# Patient Record
Sex: MALE | Race: ASIAN | NOT HISPANIC OR LATINO | URBAN - METROPOLITAN AREA
[De-identification: names, ages, dates, MRNs, and addresses within clinical notes are randomized per-mention and may not be internally consistent; named-entity substitution may affect disease eponyms.]

---

## 2022-08-18 ENCOUNTER — APPOINTMENT (RX ONLY)
Dept: URBAN - METROPOLITAN AREA CLINIC 159 | Facility: CLINIC | Age: 52
Setting detail: DERMATOLOGY
End: 2022-08-18

## 2022-08-18 DIAGNOSIS — L81.4 OTHER MELANIN HYPERPIGMENTATION: ICD-10-CM

## 2022-08-18 DIAGNOSIS — D22 MELANOCYTIC NEVI: ICD-10-CM

## 2022-08-18 DIAGNOSIS — D18.0 HEMANGIOMA: ICD-10-CM

## 2022-08-18 DIAGNOSIS — L82.0 INFLAMED SEBORRHEIC KERATOSIS: ICD-10-CM

## 2022-08-18 PROBLEM — D22.0 MELANOCYTIC NEVI OF LIP: Status: ACTIVE | Noted: 2022-08-18

## 2022-08-18 PROBLEM — D22.4 MELANOCYTIC NEVI OF SCALP AND NECK: Status: ACTIVE | Noted: 2022-08-18

## 2022-08-18 PROBLEM — D22.21 MELANOCYTIC NEVI OF RIGHT EAR AND EXTERNAL AURICULAR CANAL: Status: ACTIVE | Noted: 2022-08-18

## 2022-08-18 PROBLEM — D48.5 NEOPLASM OF UNCERTAIN BEHAVIOR OF SKIN: Status: ACTIVE | Noted: 2022-08-18

## 2022-08-18 PROBLEM — D22.39 MELANOCYTIC NEVI OF OTHER PARTS OF FACE: Status: ACTIVE | Noted: 2022-08-18

## 2022-08-18 PROBLEM — D18.01 HEMANGIOMA OF SKIN AND SUBCUTANEOUS TISSUE: Status: ACTIVE | Noted: 2022-08-18

## 2022-08-18 PROCEDURE — ? BIOPSY BY SHAVE METHOD

## 2022-08-18 PROCEDURE — 99203 OFFICE O/P NEW LOW 30 MIN: CPT | Mod: 25

## 2022-08-18 PROCEDURE — 11102 TANGNTL BX SKIN SINGLE LES: CPT | Mod: 59

## 2022-08-18 PROCEDURE — ? COUNSELING

## 2022-08-18 PROCEDURE — 17110 DESTRUCTION B9 LES UP TO 14: CPT

## 2022-08-18 PROCEDURE — ? LIQUID NITROGEN

## 2022-08-18 ASSESSMENT — LOCATION DETAILED DESCRIPTION DERM
LOCATION DETAILED: RIGHT SUPERIOR MEDIAL BUCCAL CHEEK
LOCATION DETAILED: LEFT LATERAL MALAR CHEEK
LOCATION DETAILED: LEFT SUPERIOR LATERAL MALAR CHEEK
LOCATION DETAILED: RIGHT SUPERIOR PARIETAL SCALP
LOCATION DETAILED: RIGHT INFERIOR MEDIAL MALAR CHEEK
LOCATION DETAILED: RIGHT UPPER CUTANEOUS LIP
LOCATION DETAILED: RIGHT SUPERIOR CENTRAL BUCCAL CHEEK
LOCATION DETAILED: RIGHT ANTIHELIX
LOCATION DETAILED: LEFT INFERIOR TEMPLE
LOCATION DETAILED: LEFT SUPERIOR PARIETAL SCALP
LOCATION DETAILED: LEFT CLAVICULAR NECK

## 2022-08-18 ASSESSMENT — LOCATION SIMPLE DESCRIPTION DERM
LOCATION SIMPLE: RIGHT CHEEK
LOCATION SIMPLE: RIGHT EAR
LOCATION SIMPLE: SCALP
LOCATION SIMPLE: LEFT ANTERIOR NECK
LOCATION SIMPLE: RIGHT LIP
LOCATION SIMPLE: LEFT TEMPLE
LOCATION SIMPLE: LEFT CHEEK

## 2022-08-18 ASSESSMENT — LOCATION ZONE DERM
LOCATION ZONE: FACE
LOCATION ZONE: EAR
LOCATION ZONE: SCALP
LOCATION ZONE: LIP
LOCATION ZONE: NECK

## 2022-08-18 NOTE — PROCEDURE: LIQUID NITROGEN
Show Spray Paint Technique Variable?: Yes
Medical Necessity Clause: This procedure was medically necessary because the lesions that were treated were:
Render Post-Care Instructions In Note?: no
Consent: The patient's verbal consent was obtained including but not limited to risks of crusting, scabbing, blistering, scarring, darker or lighter pigmentary change, recurrence, incomplete removal and infection.
Post-Care Instructions: I reviewed with the patient in detail post-care instructions. Patient is to wear sunprotection, and avoid picking at any of the treated lesions. Pt may apply Vaseline to crusted or scabbing areas.
Spray Paint Text: The liquid nitrogen was applied to the skin utilizing a spray paint frosting technique.
Number Of Freeze-Thaw Cycles: 2 freeze-thaw cycles
Medical Necessity Information: It is in your best interest to select a reason for this procedure from the list below. All of these items fulfill various CMS LCD requirements except the new and changing color options.
Detail Level: Detailed
Duration Of Freeze Thaw-Cycle (Seconds): 15

## 2022-10-18 ENCOUNTER — APPOINTMENT (RX ONLY)
Dept: URBAN - METROPOLITAN AREA CLINIC 159 | Facility: CLINIC | Age: 52
Setting detail: DERMATOLOGY
End: 2022-10-18

## 2022-10-18 DIAGNOSIS — D22 MELANOCYTIC NEVI: ICD-10-CM

## 2022-10-18 DIAGNOSIS — L81.4 OTHER MELANIN HYPERPIGMENTATION: ICD-10-CM

## 2022-10-18 DIAGNOSIS — D18.0 HEMANGIOMA: ICD-10-CM

## 2022-10-18 PROBLEM — D22.4 MELANOCYTIC NEVI OF SCALP AND NECK: Status: ACTIVE | Noted: 2022-10-18

## 2022-10-18 PROBLEM — D22.71 MELANOCYTIC NEVI OF RIGHT LOWER LIMB, INCLUDING HIP: Status: ACTIVE | Noted: 2022-10-18

## 2022-10-18 PROBLEM — D22.0 MELANOCYTIC NEVI OF LIP: Status: ACTIVE | Noted: 2022-10-18

## 2022-10-18 PROBLEM — D22.62 MELANOCYTIC NEVI OF LEFT UPPER LIMB, INCLUDING SHOULDER: Status: ACTIVE | Noted: 2022-10-18

## 2022-10-18 PROBLEM — D22.5 MELANOCYTIC NEVI OF TRUNK: Status: ACTIVE | Noted: 2022-10-18

## 2022-10-18 PROBLEM — D18.01 HEMANGIOMA OF SKIN AND SUBCUTANEOUS TISSUE: Status: ACTIVE | Noted: 2022-10-18

## 2022-10-18 PROBLEM — D22.21 MELANOCYTIC NEVI OF RIGHT EAR AND EXTERNAL AURICULAR CANAL: Status: ACTIVE | Noted: 2022-10-18

## 2022-10-18 PROBLEM — D22.39 MELANOCYTIC NEVI OF OTHER PARTS OF FACE: Status: ACTIVE | Noted: 2022-10-18

## 2022-10-18 PROBLEM — D22.72 MELANOCYTIC NEVI OF LEFT LOWER LIMB, INCLUDING HIP: Status: ACTIVE | Noted: 2022-10-18

## 2022-10-18 PROCEDURE — ? COUNSELING

## 2022-10-18 PROCEDURE — 99213 OFFICE O/P EST LOW 20 MIN: CPT

## 2022-10-18 ASSESSMENT — LOCATION ZONE DERM
LOCATION ZONE: TRUNK
LOCATION ZONE: LIP
LOCATION ZONE: NECK
LOCATION ZONE: FACE
LOCATION ZONE: SCALP
LOCATION ZONE: LEG
LOCATION ZONE: EAR
LOCATION ZONE: ARM

## 2022-10-18 ASSESSMENT — LOCATION DETAILED DESCRIPTION DERM
LOCATION DETAILED: LEFT ANTERIOR DISTAL THIGH
LOCATION DETAILED: RIGHT SUPERIOR PARIETAL SCALP
LOCATION DETAILED: LEFT ANTERIOR DISTAL UPPER ARM
LOCATION DETAILED: RIGHT ANTIHELIX
LOCATION DETAILED: LEFT ANTECUBITAL SKIN
LOCATION DETAILED: LEFT LATERAL MALAR CHEEK
LOCATION DETAILED: RIGHT SUPERIOR MEDIAL BUCCAL CHEEK
LOCATION DETAILED: LEFT CLAVICULAR NECK
LOCATION DETAILED: LEFT SUPERIOR LATERAL MALAR CHEEK
LOCATION DETAILED: LEFT SUPERIOR PARIETAL SCALP
LOCATION DETAILED: LEFT INFERIOR TEMPLE
LOCATION DETAILED: RIGHT BUTTOCK
LOCATION DETAILED: LEFT ANTERIOR SHOULDER
LOCATION DETAILED: RIGHT UPPER CUTANEOUS LIP
LOCATION DETAILED: RIGHT DISTAL CALF

## 2022-10-18 ASSESSMENT — LOCATION SIMPLE DESCRIPTION DERM
LOCATION SIMPLE: RIGHT BUTTOCK
LOCATION SIMPLE: RIGHT CALF
LOCATION SIMPLE: LEFT SHOULDER
LOCATION SIMPLE: LEFT ELBOW
LOCATION SIMPLE: RIGHT LIP
LOCATION SIMPLE: RIGHT CHEEK
LOCATION SIMPLE: RIGHT EAR
LOCATION SIMPLE: LEFT ANTERIOR NECK
LOCATION SIMPLE: SCALP
LOCATION SIMPLE: LEFT TEMPLE
LOCATION SIMPLE: LEFT CHEEK
LOCATION SIMPLE: LEFT UPPER ARM
LOCATION SIMPLE: LEFT THIGH

## 2025-08-12 ENCOUNTER — APPOINTMENT (OUTPATIENT)
Dept: URBAN - METROPOLITAN AREA CLINIC 141 | Facility: CLINIC | Age: 55
Setting detail: DERMATOLOGY
End: 2025-08-12

## 2025-08-12 DIAGNOSIS — L84 CORNS AND CALLOSITIES: ICD-10-CM

## 2025-08-12 DIAGNOSIS — B07.8 OTHER VIRAL WARTS: ICD-10-CM

## 2025-08-12 PROBLEM — D23.4 OTHER BENIGN NEOPLASM OF SKIN OF SCALP AND NECK: Status: ACTIVE | Noted: 2025-08-12

## 2025-08-12 PROCEDURE — ? PRESCRIPTION

## 2025-08-12 PROCEDURE — ? TREATMENT REGIMEN

## 2025-08-12 PROCEDURE — ? OBSERVATION

## 2025-08-12 PROCEDURE — ? COUNSELING

## 2025-08-12 RX ORDER — PHARMACY COMPOUNDING ACCESSORY
EACH MISCELLANEOUS AT NIGHT
Qty: 5 | Refills: 0 | Status: ERX | COMMUNITY
Start: 2025-08-12

## 2025-08-12 RX ADMIN — Medication: at 00:00

## 2025-08-12 ASSESSMENT — LOCATION ZONE DERM
LOCATION ZONE: TOE
LOCATION ZONE: FEET

## 2025-08-12 ASSESSMENT — LOCATION SIMPLE DESCRIPTION DERM
LOCATION SIMPLE: RIGHT PLANTAR SURFACE
LOCATION SIMPLE: RIGHT 5TH TOE

## 2025-08-12 ASSESSMENT — LOCATION DETAILED DESCRIPTION DERM
LOCATION DETAILED: RIGHT DORSAL 5TH TOE
LOCATION DETAILED: RIGHT PLANTAR FOREFOOT OVERLYING 2ND METATARSAL